# Patient Record
Sex: MALE | Race: WHITE | NOT HISPANIC OR LATINO | URBAN - METROPOLITAN AREA
[De-identification: names, ages, dates, MRNs, and addresses within clinical notes are randomized per-mention and may not be internally consistent; named-entity substitution may affect disease eponyms.]

---

## 2020-11-14 ENCOUNTER — EMERGENCY (EMERGENCY)
Facility: HOSPITAL | Age: 30
LOS: 1 days | Discharge: HOME | End: 2020-11-14
Attending: STUDENT IN AN ORGANIZED HEALTH CARE EDUCATION/TRAINING PROGRAM | Admitting: STUDENT IN AN ORGANIZED HEALTH CARE EDUCATION/TRAINING PROGRAM
Payer: COMMERCIAL

## 2020-11-14 VITALS
HEART RATE: 72 BPM | DIASTOLIC BLOOD PRESSURE: 80 MMHG | OXYGEN SATURATION: 99 % | RESPIRATION RATE: 17 BRPM | SYSTOLIC BLOOD PRESSURE: 122 MMHG | TEMPERATURE: 98 F

## 2020-11-14 PROCEDURE — 99283 EMERGENCY DEPT VISIT LOW MDM: CPT

## 2020-11-14 RX ADMIN — Medication 1 TABLET(S): at 10:43

## 2020-11-14 NOTE — ED ADULT TRIAGE NOTE - CHIEF COMPLAINT QUOTE
pt states "I had a bump on my lower lip and tried to pop it, now my lip is swollen" pt states he was recently treated for MRSA in another wound. rr even and unlabored, no swelling to tongue noted, pt speaking in clear sentences.

## 2020-11-14 NOTE — ED PROVIDER NOTE - ATTENDING CONTRIBUTION TO CARE
Denys HOPKINS: I agree with the above provided history and exam and addend/modify it as follows.    29M w/ bilat lower lip swelling and pain since yesterday. Pt reports that he popped a swelling yesterday. Patient reports in June he was diagnosed with buttock MRSA infection by dermatologist which improved after getting antibiotics. No fever, n/v/d/c, chest / abd pain, cough, sob, dizziness, dysuria/hematuria. No new medications / possible allergic contacts the past 2 weeks. Pt reports his father (a doc) gave him an antibiotic yesterday, doesn't remember the name, was 250mg. Patient reports family members have had multiple episodes skin infections/MRSA over the past year. Exam significant for uniformly swollen lip, no tongue / oropharyngeal swelling, no LAD, on POCUS no hypoechoic structures concerning for lip abscess. No drainage or erythema.     Plan to give bactrim for possible soft tissue skin infection, dc w/ close outpt f/u and strict return precautions. No indication for I&D at this point    I Julio Cesar Mcfarlane MD performed a history and physical exam of the patient and discussed their management with the resident and /or advanced care provider. I reviewed the resident and /or ACP's note and agree with the documented findings and plan of care. My medical decision making and observations are found above.

## 2020-11-14 NOTE — ED PROVIDER NOTE - CLINICAL SUMMARY MEDICAL DECISION MAKING FREE TEXT BOX
29yM w/pmhx MRSA infections, appendectomy presenting with lower lip swelling. Given hx of 2 recent MRSA infections will cover with abx for lip cellulitis. US at bedside to evaluate for drainable fluid collection

## 2020-11-14 NOTE — ED ADULT NURSE REASSESSMENT NOTE - NS ED NURSE REASSESS COMMENT FT1
Pt a&ox3, calm and cooperative, c/o of lips welling x few days, denies any breathing difficulty, f/c, n/v/d. respirations even/unlabored, nad noted.

## 2020-11-14 NOTE — ED PROVIDER NOTE - OBJECTIVE STATEMENT
29yM w/pmhx MRSA infections, appendectomy presenting with lower lip swelling. Pt states 1-2 days ago he noticed a bump on the outside of his lower lip and attempted to pop it, overnight pt reports swelling and pain to the lower lip which has since worsened. Pt reports 2 prior MRSA infections this year as well as numerous family members in the same house with MRSA infections. Pt denies fever/chills, throat swelling or pain, difficulty speaking or swallowing, headache, nausea, vomiting, rash, new foods/soaps or any other concerns.

## 2020-11-14 NOTE — ED PROVIDER NOTE - PATIENT PORTAL LINK FT
You can access the FollowMyHealth Patient Portal offered by [facility] by registering at the following website: [email or web address]. By joining [facility]’s FollowMyHealth portal, you will also be able to view your health information using other applications (apps) compatible with our system.

## 2020-11-14 NOTE — ED PROVIDER NOTE - PHYSICAL EXAMINATION
diffuse lower lip swelling and tenderness, small scabbed area to front of lower lip, no spontaneous drainage,

## 2020-12-09 PROBLEM — Z78.9 OTHER SPECIFIED HEALTH STATUS: Chronic | Status: ACTIVE | Noted: 2020-11-14

## 2020-12-29 PROBLEM — Z00.00 ENCOUNTER FOR PREVENTIVE HEALTH EXAMINATION: Status: ACTIVE | Noted: 2020-12-29

## 2021-01-13 ENCOUNTER — APPOINTMENT (OUTPATIENT)
Dept: OTOLARYNGOLOGY | Facility: CLINIC | Age: 31
End: 2021-01-13
Payer: COMMERCIAL

## 2021-01-13 VITALS
WEIGHT: 270 LBS | SYSTOLIC BLOOD PRESSURE: 119 MMHG | TEMPERATURE: 97.6 F | HEART RATE: 74 BPM | BODY MASS INDEX: 36.57 KG/M2 | DIASTOLIC BLOOD PRESSURE: 78 MMHG | HEIGHT: 72 IN

## 2021-01-13 DIAGNOSIS — J31.0 CHRONIC RHINITIS: ICD-10-CM

## 2021-01-13 DIAGNOSIS — K21.9 GASTRO-ESOPHAGEAL REFLUX DISEASE W/OUT ESOPHAGITIS: ICD-10-CM

## 2021-01-13 PROCEDURE — 99072 ADDL SUPL MATRL&STAF TM PHE: CPT

## 2021-01-13 PROCEDURE — 99204 OFFICE O/P NEW MOD 45 MIN: CPT | Mod: 25

## 2021-01-13 PROCEDURE — 31231 NASAL ENDOSCOPY DX: CPT

## 2021-01-13 NOTE — ASSESSMENT
[FreeTextEntry1] : Patient complains of nasal congestion and snoring \par Left severe DNS and Rhinitis\par -Rx: Steam humidification and hypertonic saline nasal irrigations \par consider nasal surgery to improve breathing and to avoid sinusitis\par a CT scan of the sinuses will be obtained if/when considering surgical management\par \par GERD:\par -Antireflux recommendations were given to the patient\par -Maalox and omeprazole prescribed\par -A formal GI evaluation may be needed and was discussed with the patient.\par \par Snoring:\par -advised for weight loss \par \par f/u 2-3 month

## 2021-01-13 NOTE — PHYSICAL EXAM
[Midline] : trachea located in midline position [Normal] : no rashes [Nasal Endoscopy Performed] : nasal endoscopy was performed, see procedure section for findings [] : septum deviated to the left [de-identified] : 2+

## 2021-01-13 NOTE — PROCEDURE
[FreeTextEntry6] : Anterior Rhinoscopy insufficient to account for symptoms.\par \par After informed verbal consent is obtained, the fiberoptic nasal endoscope #24 is passed via the right nasal cavity.\par The following anatomic sites were directly examined in a sequential fashion:\par The scope was introduced in both  nasal passages between the middle and inferior turbinates to exam the inferior portion of the middle meatus and the fontanelle, as well as the maxillary ostia.  Next, the scope was passed medially and posteriorly to the middle turbinates to examine the sphenoethmoid recess and the superior turbinate region.\par  \par \par   There is [ 0 ]% obstruction of the nasopharynx with adenoid tissue.\par \par A deviated nasal septum was noted causing obstruction.\par The turbinates were congested-bilateral.\par \par Right Side:\par ·               Mucosa: wnl\par ·               Mucous: none\par ·               Polyp: none\par ·               Inferior Turbinate: sl congested\par ·               Middle Turbinate:sl congested\par ·               Superior Turbinate: wnl\par ·               Inferior Meatus:clear\par ·               Middle Meatus: clear\par ·               Super Meatus: clear\par ·               Sphenoethmoidal Recess: wnl\par \par \par \par Left Side:\par ·               Mucosa: wnl\par ·               Mucous:none\par ·               Polyp: none\par ·               Inferior Turbinate: sl congested\par ·               Middle Turbinate: sl congested\par ·               Superior Turbinate:wnl\par ·               Inferior Meatus: clear\par ·               Middle Meatus: clear\par ·               Super Meatus:clear\par ·               Sphenoethmoidal Recess: wnl\par \par  [de-identified] : Reason for the procedure-throat symptoms not adequately evaluated by mirror exam\par After informed verbal consent is obtained. \par The fiberoptic laryngoscope #24 is passed via the  nasal cavity. There is no adenoid hypertrophy of the nasopharynx.  \par The hypopharynx is clear with no lesions or masses. \par The vocal cords are clear intact, within normal limits and mobile bilaterally with  \par evidence of posterior laryngeal erythema and edema and erythema of the arytenoids.\par \par Tongue Base-wnl\par Larynx-wnl\par Hypopharynx-wnl\par tongue base, \par vallecular-wnl\par epiglottis-wnl\par subglottis-wnl\par posterior pharyngeal wall-wnl\par \par true vocal cords-wnl\par arytenoids-erythema and edema\par false vocal cords-wnl\par ventricles-wnl \par pyriform sinus-wnl no pooling\par aryepiglottic folds-wnl\par \par

## 2021-01-13 NOTE — HISTORY OF PRESENT ILLNESS
[No] : patient does not have a  history of radiation therapy [de-identified] : 30-year-old male\par Patient complains of snoring and DNS x 2 years. States he is always nasally congested, usually only can breath from one nostril. Has tried nasal sprays and breathing strips with no relief. \par Pt has no ear pain, ear drainage, hearing loss, tinnitus, vertigo, nasal discharge, epistaxis, sinus infections, facial pain, facial pressure, throat pain, dysphagia or fevers\par The nasal congestion and snoring has worsened since recent weight gain\par No history of nasal trauma\par \par  [Tinnitus] : no tinnitus [Dizziness] : no dizziness [Hearing Loss] : no hearing loss [Meniere Disease] : no Meniere disease [Eustachian Tube Dysfunction] : no eustachian tube dysfunction [Otosclerosis] : no otosclerosis [Perilymphatic Fistula] : no perilymphatic fistula [Hypertension] : no hypertension [Loud Noise Exposure] : no history of loud noise exposure [Smoking] : no smoking [Early Onset Hearing Loss] : no early onset hearing loss [Stroke] : no stroke [Facial Pain] : no facial pain [Headache] : no headache [Facial Pressure] : no facial pressure [Nasal Congestion] : nasal congestion [Dental Pain] : no dental pain [Ear Fullness] : no ear fullness [Septal Deviation] : septal deviation [Environmental Allergies] : no environmental allergies [Seasonal Allergies] : no seasonal allergies [Environmental Allergens] : no environmental allergens [Adenoidectomy] : no adenoidectomy [Allergies] : no allergies [Asthma] : no asthma [Snoring] : Snoring [Neck Mass] : no neck mass [Neck Pain] : no neck pain [Chills] : no chills [Cold Intolerance] : no cold intolerance [Cough] : no cough [Fatigue] : no fatigue [Heat Intolerance] : no heat intolerance [Hyperthyroidism] : no hyperthyroidism [Sialadenitis] : no sialadenitis [Hodgkin Disease] : no hodgkin disease [Non-Hodgkin Lymphoma] : no non-hodgkin lymphoma [Tobacco Use] : no tobacco use [Alcohol Use] : no alcohol use [Graves Disease] : no graves disease [Thyroid Cancer] : no thyroid cancer

## 2021-05-26 ENCOUNTER — APPOINTMENT (OUTPATIENT)
Dept: OTOLARYNGOLOGY | Facility: CLINIC | Age: 31
End: 2021-05-26

## 2023-05-01 ENCOUNTER — NON-APPOINTMENT (OUTPATIENT)
Age: 33
End: 2023-05-01

## 2023-05-01 ENCOUNTER — APPOINTMENT (OUTPATIENT)
Dept: OTOLARYNGOLOGY | Facility: CLINIC | Age: 33
End: 2023-05-01
Payer: COMMERCIAL

## 2023-05-01 VITALS
WEIGHT: 255 LBS | BODY MASS INDEX: 34.54 KG/M2 | HEART RATE: 81 BPM | DIASTOLIC BLOOD PRESSURE: 70 MMHG | HEIGHT: 72 IN | SYSTOLIC BLOOD PRESSURE: 107 MMHG | TEMPERATURE: 98 F

## 2023-05-01 DIAGNOSIS — J30.2 OTHER SEASONAL ALLERGIC RHINITIS: ICD-10-CM

## 2023-05-01 DIAGNOSIS — F17.290 NICOTINE DEPENDENCE, OTHER TOBACCO PRODUCT, UNCOMPLICATED: ICD-10-CM

## 2023-05-01 DIAGNOSIS — Z78.9 OTHER SPECIFIED HEALTH STATUS: ICD-10-CM

## 2023-05-01 PROCEDURE — 99214 OFFICE O/P EST MOD 30 MIN: CPT | Mod: 25

## 2023-05-01 PROCEDURE — 31231 NASAL ENDOSCOPY DX: CPT

## 2023-05-01 RX ORDER — METHYLPREDNISOLONE 4 MG/1
4 TABLET ORAL
Qty: 1 | Refills: 0 | Status: ACTIVE | COMMUNITY
Start: 2023-05-01 | End: 1900-01-01

## 2023-05-01 NOTE — END OF VISIT
[FreeTextEntry3] : I, Dr. Boyd personally performed the evaluation and management (E/M) services including all necessary procedures, for this new patient. That E/M includes conducting the clinically appropriate initial history &/or exam, assessing all conditions, and establishing the plan of care. Today, my KENNEDI, Svetlana Morales, was here to observe &/or participate in the visit & follow plan of care established by me.\par \par \par

## 2023-05-01 NOTE — ASSESSMENT
[FreeTextEntry1] : Patient overweight Ozempic trying to lose weight his main complaint is snoring has underlying allergies endoscopically has a significantly deviated septum knows he needs to lose weight otherwise surgery will not resolve or help his snoring knows to keep his allergies under control for the same reason he is to 51 now if he loses 25 pounds consideration for septoplasty will be given if indeed he controls his allergies.  All the pros and cons were discussed and accepted.

## 2023-05-01 NOTE — HISTORY OF PRESENT ILLNESS
[de-identified] : Patient has had issues breathing through the nose bilaterally for years. HE does snore at night as well. He has nasal obstruction always, changes sides. He has seasonal allergies and gets allergy shots.\par He does not currently use any nasal sprays. \par He denies sinus infections or rfacial pain or pressure recurrent pressure but does have two days of tenderness in the right forehead.  He does get popping of the eras when his nose is very congested

## 2023-08-17 ENCOUNTER — APPOINTMENT (OUTPATIENT)
Dept: OTOLARYNGOLOGY | Facility: CLINIC | Age: 33
End: 2023-08-17
Payer: COMMERCIAL

## 2023-08-17 VITALS
BODY MASS INDEX: 31.56 KG/M2 | HEIGHT: 72 IN | WEIGHT: 233 LBS | HEART RATE: 89 BPM | DIASTOLIC BLOOD PRESSURE: 73 MMHG | SYSTOLIC BLOOD PRESSURE: 112 MMHG

## 2023-08-17 DIAGNOSIS — J34.2 DEVIATED NASAL SEPTUM: ICD-10-CM

## 2023-08-17 DIAGNOSIS — R06.83 SNORING: ICD-10-CM

## 2023-08-17 DIAGNOSIS — R09.81 NASAL CONGESTION: ICD-10-CM

## 2023-08-17 PROCEDURE — 99214 OFFICE O/P EST MOD 30 MIN: CPT | Mod: 25

## 2023-08-17 PROCEDURE — 31231 NASAL ENDOSCOPY DX: CPT

## 2023-08-17 NOTE — END OF VISIT
[FreeTextEntry3] : I, Dr. Boyd personally performed the evaluation and management (E/M) services , including all procedures, for this established patient who presents today with (a) new problem(s)/exacerbation of (an) existing condition(s). That E/M includes conducting the clinically appropriate interval history &/or exam, assessing all new/exacerbated conditions, and establishing a new plan of care. Today, my KENNEDI, Svetlana Morales, was here to observe &/or participate in the visit & follow plan of care established by me.

## 2023-08-17 NOTE — ASSESSMENT
[FreeTextEntry1] : Patient follows up has lost 22 pounds is now closer to 30 we will proceed with a septoplasty turbinoplasty we will get him scheduled for he is progressing well still is going to continue to lose weight risks and benefits of the surgery were discussed endoscopically again confirmed his deviated nasal septum.

## 2023-08-17 NOTE — HISTORY OF PRESENT ILLNESS
[de-identified] : Patient has lost about 20 pounds since the last visit and is definitely breathing better but still snores at night a little. He does not have any recent sinus pressure or pain. He still controls his allergies and does nasal sprays . He is overall doing well but still feels he is a mouth breather

## 2025-05-19 ENCOUNTER — NON-APPOINTMENT (OUTPATIENT)
Age: 35
End: 2025-05-19

## 2025-05-20 ENCOUNTER — APPOINTMENT (OUTPATIENT)
Dept: ORTHOPEDIC SURGERY | Facility: CLINIC | Age: 35
End: 2025-05-20